# Patient Record
Sex: FEMALE | Race: WHITE
[De-identification: names, ages, dates, MRNs, and addresses within clinical notes are randomized per-mention and may not be internally consistent; named-entity substitution may affect disease eponyms.]

---

## 2019-01-24 ENCOUNTER — HOSPITAL ENCOUNTER (OUTPATIENT)
Dept: HOSPITAL 80 - FSGY | Age: 59
Discharge: HOME | End: 2019-01-24
Attending: UROLOGY
Payer: COMMERCIAL

## 2019-01-24 VITALS — SYSTOLIC BLOOD PRESSURE: 115 MMHG | DIASTOLIC BLOOD PRESSURE: 66 MMHG

## 2019-01-24 DIAGNOSIS — E03.9: ICD-10-CM

## 2019-01-24 DIAGNOSIS — C67.9: Primary | ICD-10-CM

## 2019-01-24 PROCEDURE — 0TBB8ZZ EXCISION OF BLADDER, VIA NATURAL OR ARTIFICIAL OPENING ENDOSCOPIC: ICD-10-PCS | Performed by: UROLOGY

## 2019-01-24 NOTE — PDGENHP
History & Physical


Chief Complaint: recurent bladder tumor


History of Present Illness: Hx bladder tumor, surveillance cysto showed 

recurrence.  Here for TURBT.


Pertinent Past, Social, Family History: PMH/PSH/FH/SH reviewed


Relevant Physical Exam: Gen NAD.  CV regular.  Lung Nl effort.  Abd soft.  Ext 

warm


Cardiorespiratory Assessment: Recurrent Tumor.  TURBT, possible MMC.  Ancef

## 2019-01-24 NOTE — PDANEPAE
ANE Past Medical History





- Cardiovascular History


Hx Hypertension: Yes


Hx Arrhythmias: No


Hx Chest Pain: No


Hx Coronary Artery / Peripheral Vascular Disease: No


Hx CHF / Valvular Disease: No


Hx Palpitations: No





- Pulmonary History


Hx COPD: Yes


Hx Asthma/Reactive Airway Disease: Yes


Hx Recent Upper Respiratory Infection: Yes


Hx Oxygen in Use at Home: No


Hx Sleep Apnea: Yes


Sleep Apnea Screening Result - Last Documented: Positive


Pulmonary History Comment: TROUBLE BREATHING AFTER SURGERY 2/2017 USED OXYGEN 

AT HOME POST OP.  exercise induced asthma





- Neurologic History


Hx Cerebrovascular Accident: No


Hx Seizures: No


Hx Dementia: No





- Endocrine History


Hx Diabetes: No


Endocrine History Comment: HYPOTHYROID





- Renal History


Hx Renal Disorders: No


Renal History Comment: HAS HAD UA INCONT SURG NOW DOING OK





- Liver History


Hx Hepatic Disorders: No





- Neurological & Psychiatric Hx


Hx Neurological and Psychiatric Disorders: Yes


Neurological / Psychiatric History Comment: ADHD.  CHRONIC FATIQUE.  DEPRESSION 

gets ECT





- Cancer History


Hx Cancer: Yes


Cancer History Comment: BLADDER





- Congenital Disorder History


Hx Congenital Disorders: No





- GI History


Hx Gastrointestinal Disorders: Yes


Gastrointestinal History Comment: IBS with constipation/diarrhea





- Other Health History


Other Health History: WEAK BACK.  INTERMITTENT SCIATICA.  denture upper





- Chronic Pain History


Chronic Pain: No





- Surgical History


Prior Surgeries: 11/2016 AT [facility] RECTOCELE AND BLADDER SLING(NOTICED BLADDER 

TUMOR) WITH REMVL OF  TUMOR 2/2017.  DX LAP.  REML CERVICAL POLYP/DYSPLASIA





ANE Review of Systems


Review of Systems: 








- Exercise capacity


METS (RN): 4 METS





ANE Patient History





- Allergies


Allergies/Adverse Reactions: 








hydrocodone [From Vicodin] Allergy (Verified 01/24/19 06:24)


 ITCH


metronidazole [From Flagyl] Allergy (Verified 01/24/19 06:24)


 SWELLING


oxycodone [From Percocet] Allergy (Verified 01/24/19 06:24)


 GI








- Home Medications


Home Medications: 








Celexa  10/31/17 [Last Taken Unknown]


Dulera 100 Mcg/5 Mcg Inhaler  10/31/17 [Last Taken Unknown]


Flonase Nasal [place]  10/31/17 [Last Taken Unknown]


Herbal Drugs  10/31/17 [Last Taken 11/02/17]


Hydrochlorothiazide  10/31/17 [Last Taken 11/02/17]


Liothyronine Sodium  10/31/17 [Last Taken 11/02/17]


Montelukast Sodium  10/31/17 [Last Taken 11/02/17]


Proair Hfa  10/31/17 [Last Taken 11/02/17]


Ranitidine HCl  10/31/17 [Last Taken 11/03/17 06:00]


Synthroid  10/31/17 [Last Taken 11/03/17 06:00]


VYVANSE  10/31/17 [Last Taken 11/02/17]


Acetaminophen [Tylenol 325mg (*)]  01/14/19 [Last Taken Unknown]








- NPO status


NPO Since - Liquids (Date): 01/23/19


NPO Since - Liquids (Time): 23:00


NPO Since - Solids (Date): 01/23/19


NPO Since - Solids (Time): 23:00





- Smoking Hx


Smoking Status: Former smoker





- Family Anes Hx


Family Hx Anesthesia Complications: none





ANE Labs/Vital Signs





- Vital Signs


Blood Pressure: 113/84


Heart Rate: 85


Respiratory Rate: 16


O2 Sat (%): 91


Height: 160.02 cm


Weight: 90.718 kg





ANE Physical Exam





- Airway


Neck exam: FROM


Mallampati Score: Class 2


Mouth exam: normal dental/mouth exam





- Pulmonary


Pulmonary: no respiratory distress, no rales or rhonchi, clear to auscultation





- Cardiovascular


Cardiovascular: regular rate and rhythym, no murmur, rub, or gallop





- ASA Status


ASA Status: III





ANE Anesthesia Plan


Anesthesia Plan: GA w LMA

## 2019-01-24 NOTE — POSTOPPROG
Post Op Note


Date of Operation: 01/24/19


Surgeon: Adore Arevalo


Anesthesiologist: Carlos Eduardo


Anesthesia: GET(General Endotracheal)


Pre-op Diagnosis: bladder tumor


Post-op Diagnosis: same


Indication: recurrent bladder tumor


Procedure: cysto, TURBT small, bladder bx


Findings: recurrent papillary tumors x 3


Inf/Abcess present in the surg proc area at time of surgery?: No


EBL: Minimal


Complications: 





none, patient tolerated procedure well


Drains: Other (jansen)

## 2019-01-25 NOTE — GOP
DATE OF OPERATION:  01/24/2019



SURGEON:  Adore Arevalo MD



ANESTHESIA:  General.



ANESTHESIOLOGIST:  Loi Thibodeaux MD.



PREOPERATIVE DIAGNOSIS:  Recurrent bladder tumor.



POSTOPERATIVE DIAGNOSIS:  Recurrent bladder tumor.



PROCEDURE PERFORMED:  Cystoscopy with transurethral resection of a small tumor and then cold cup annamarie
luisa of other tumors.



FINDINGS:  Three small papillary tumors, one approximately 3 mm and the remaining ones were 1 mm in s
ize.  The 3 mm one was removed with the resectoscope loop and the other 2 were removed with a cold cu
p biopsy forceps.



SPECIMENS:  Bladder tumor.



ESTIMATED BLOOD LOSS:  Minimal.



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
py.



DESCRIPTION OF PROCEDURE:  The patient was taken back to the cystoscopy suite, placed on the cystosco
py table in the supine position.  General anesthesia induced without complication.  Time-out performe
d and core measures satisfied, including placement of a Swati Hugger, SCDs, and administration of 2 g 
Ancef antibiotics.  She was brought to the end of the table, placed in a dorsal lithotomy position.  
All pressure points padded.  Genitalia draped and prepped in a standard surgical fashion with Cristal arredondo.  A rigid cystoscope easily cannulated her urethral meatus and was advanced atraumatically into her
 bladder.  Her bladder was not a round vessel.  It was pulled to the right because of prior urogyneco
logy procedures and so it pulls to the right.  I looked around the whole bladder with a 30 and a 70 d
egree lens and noted 3 small tumors, 1 about 3 mm in size that was close to the dome and then the oth
er two were to the left of this, but again anterior and toward the dome.  The remaining bladder looke
d healed.  Her prior resection site was well healed and there were no tumors around that.  



After doing cystoscopy, I assembled the resectoscope and used the loop to resect the larger of these 
small tumors.  I resected it in its entirety and handed off to Pathology for permanent samples and th
en I used the cold cup biopsy forceps that hooked into the TURis resectoscope obturator and biopsied 
the remaining 2 small tumors and then I reassembled the loop and cauterized these resection sites.  H
emostasis was excellent at the end.  I do feel I cleared her of the tumor with this procedure and all
 samples were sent to pathology as permanent.  Her bladder was then emptied and a Francis catheter plac
ed with a plan for installation of mitomycin-C in the PACU.  She did well for the procedure.



COMPLICATIONS:  None.



DRAINS:  Francis catheter was placed in the operating as a drain.





Job #:  [id number]

## 2019-01-25 NOTE — PDFACE2FAC
Face to Face Encounter





Note date:  1/24/2019 = Patient was seen in the PACU, jansen in place and 

verification made that all urine drained from bladder.  Personal protective 

equipment was worne by me and patinet protected with drape.  Then 40mg 

mitomycin in 40ml NS was instilled into bladder through catheter and catheter.  

She will keep this in her bladder for 1 hr, changing position every 15m and 

then chemotherapy drained and jansen removed.

## 2019-06-26 ENCOUNTER — HOSPITAL ENCOUNTER (OUTPATIENT)
Dept: HOSPITAL 80 - FIMAGING | Age: 59
End: 2019-06-26
Payer: COMMERCIAL